# Patient Record
Sex: FEMALE | Race: WHITE | NOT HISPANIC OR LATINO | Employment: OTHER | URBAN - METROPOLITAN AREA
[De-identification: names, ages, dates, MRNs, and addresses within clinical notes are randomized per-mention and may not be internally consistent; named-entity substitution may affect disease eponyms.]

---

## 2017-01-03 ENCOUNTER — TRANSCRIBE ORDERS (OUTPATIENT)
Dept: ADMINISTRATIVE | Facility: HOSPITAL | Age: 72
End: 2017-01-03

## 2017-01-03 DIAGNOSIS — E04.1 NONTOXIC UNINODULAR GOITER: Primary | ICD-10-CM

## 2017-01-04 DIAGNOSIS — E04.1 NONTOXIC SINGLE THYROID NODULE: ICD-10-CM

## 2017-03-20 ENCOUNTER — HOSPITAL ENCOUNTER (OUTPATIENT)
Dept: RADIOLOGY | Facility: HOSPITAL | Age: 72
Discharge: HOME | End: 2017-03-20
Attending: SURGERY | Admitting: SURGERY
Payer: MEDICARE

## 2017-03-20 DIAGNOSIS — E04.1 NONTOXIC SINGLE THYROID NODULE: ICD-10-CM

## 2017-03-20 DIAGNOSIS — E04.1 NONTOXIC UNINODULAR GOITER: ICD-10-CM

## 2017-03-20 PROCEDURE — 88172 CYTP DX EVAL FNA 1ST EA SITE: CPT | Performed by: SURGERY

## 2017-03-20 PROCEDURE — 10022 HB FNA W/IMAGE: CPT

## 2017-03-20 PROCEDURE — 76942 ECHO GUIDE FOR BIOPSY: CPT

## 2017-03-20 PROCEDURE — 88173 CYTOPATH EVAL FNA REPORT: CPT | Performed by: SURGERY

## 2017-03-20 RX ORDER — LIDOCAINE HYDROCHLORIDE 10 MG/ML
5 INJECTION, SOLUTION INFILTRATION; PERINEURAL ONCE
Status: COMPLETED | OUTPATIENT
Start: 2017-03-20 | End: 2017-03-20

## 2017-03-20 RX ADMIN — LIDOCAINE HYDROCHLORIDE 5 ML: 10 INJECTION, SOLUTION INFILTRATION; PERINEURAL at 10:15

## 2017-03-21 ENCOUNTER — GENERIC CONVERSION - ENCOUNTER (OUTPATIENT)
Dept: OTHER | Facility: OTHER | Age: 72
End: 2017-03-21

## 2017-03-28 ENCOUNTER — GENERIC CONVERSION - ENCOUNTER (OUTPATIENT)
Dept: OTHER | Facility: OTHER | Age: 72
End: 2017-03-28

## 2017-10-23 ENCOUNTER — ALLSCRIPTS OFFICE VISIT (OUTPATIENT)
Dept: OTHER | Facility: OTHER | Age: 72
End: 2017-10-23

## 2017-10-24 NOTE — PROGRESS NOTES
Assessment  1  Visit for screening mammogram (V76 12) (Z12 31)   2  Encounter for gynecological examination without abnormal finding (V72 31) (Z01 419)    Pelvic exam reveals the uterus to prolapsed to the introitus  Uterus is mobile normal size  No adnexal masses are identified  Cervix is normal to appearance  There is no blood or discharge in the vagina  The vulva is normal  Rectal exam shows no blood or masses in the rectum and no nodularity in the cul-de-sac  Breast exam is normal      Plan  Encounter for gynecological examination without abnormal finding    · (1) THIN PREP PAP WITH IMAGING; Status:Active - Retrospective Authorization; Requested XDY:[id number];    Perform:[facility] [place] [address]; RVF:[id number]; Last Updated By:Andi Pichardo; 10/23/2017 9:17:38 AM;Ordered;For:Encounter for gynecological examination without abnormal finding; Ordered By:Vinay Marcum; Maturation index required? : No  :   HPV? : if ASCUS   · Follow-up visit in 1 year Evaluation and Treatment  Follow-up  Status: Hold For -  Scheduling  Requested for: 82YNP6065   Ordered; For: Encounter for gynecological examination without abnormal finding; Ordered By: Jared Plunkett Performed:  Due: 95SJC9748  Encounter for screening mammogram for malignant neoplasm of breast    · * MAMMO SCREENING BILATERAL W CAD; Status:Active; Requested for:93Cjz6501;    Perform:[facility]; OET:[id number]; Ordered;For:Encounter for screening mammogram for malignant neoplasm of breast; Ordered By:Vinay Marcum; No Pap smear was taken at this visit  The patient was given a slip for bilateral screening mammogram to be performed after November 17, 2017  She will return in one year for follow-up GYN evaluation of her uterine prolapse  Discussion/Summary    The patient was informed that her breast and pelvic exam are normal except for uterine prolapse  She will call she sees any vaginal bleeding over the next year  Chief Complaint  annual exam no problems      History of Present Illness  HPI: This 23-year-old patient has had no vaginal bleeding or episodes of vaginitis over the past year  She has no hot flashes chronic headaches or fainting spells  She sleeps poorly at night but is in bed at 8:00 at night and gets up at 6 and the morning  She sleeps intermittently the last void frequently  I did give her a prescription for Ativan 1 mg to take a half hour before bedtime #21  She did have a thyroid biopsy in March of this year which showed tissue to be benign  She is not bothered by pressure with her uterine prolapse  She does take Senokot occasionally for constipation  She does not have any problem with urine stress incontinence or recurrent urinary infections  GYN HM, Adult Female [facility]:   General Health:   Lifestyle:  She does not use tobacco -- She consumes alcohol -- She denies drug use  Reproductive health: the patient is postmenopausal--   she reports no menstrual problems  -- she uses no contraception  -- she is not sexually active  -- pregnancy history: G 3P 3,-- 3  Screening: Cervical cancer screening includes a pap smear performed 10/14/2016  Breast cancer screening includes a mammogram performed 11/17/2016  Colorectal cancer screening includes uncertain timing of the last colonoscopy  Review of Systems    Constitutional: No fever, no chills, feels well, no tiredness, no recent weight gain or loss  ENT: no ear ache, no loss of hearing, no nosebleeds or nasal discharge, no sore throat or hoarseness  Cardiovascular: no complaints of slow or fast heart rate, no chest pain, no palpitations, no leg claudication or lower extremity edema  Respiratory: no complaints of shortness of breath, no wheezing, no dyspnea on exertion, no orthopnea or PND  Breasts: no complaints of breast pain, breast lump or nipple discharge     Gastrointestinal: no complaints of abdominal pain, no constipation, no nausea or diarrhea, no vomiting, no bloody stools  Genitourinary: no complaints of dysuria, no incontinence, no pelvic pain, no dysmenorrhea, no vaginal discharge or abnormal vaginal bleeding  Musculoskeletal: no complaints of arthralgia, no myalgia, no joint swelling or stiffness, no limb pain or swelling  Integumentary: no complaints of skin rash or lesion, no itching or dry skin, no skin wounds  Neurological: no complaints of headache, no confusion, no numbness or tingling, no dizziness or fainting  Active Problems  1  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)   2  Encounter for gynecological examination without abnormal finding (V72 31) (Z01 419)   3  Encounter for screening mammogram for malignant neoplasm of breast (V76 12)   (Z12 31)   4  First degree uterine prolapse (618 1) (N81 2)   5  Nodule of left lobe of thyroid gland (241 0) (E04 1)   6  Nodule of neck (784 2) (R22 1)   7  Thyroid nodule (241 0) (E04 1)   8  Visit for screening mammogram (V76 12) (Z12 31)    Past Medical History   · History of Ovarian cyst (620 2) (N83 20)    Surgical History   · History of Cholecystectomy    Family History  Mother    · Family history of Diabetes   · Family history of malignant neoplasm of brain (V16 8) (Z80 8)  Father    · Family history of Diabetes    Social History   · Always uses seat belt   · Being A Social Drinker   · Daily Coffee Consumption (___ Cups/Day)   ·    · Exercise Frequency (Times/Week)   · Full-time employment   · Marital History - Single   · Never a smoker   · No drug use   · Occasional alcohol use   · Unknown If Ever Smoked    Current Meds   1  Lisinopril 10 MG Oral Tablet; Therapy: [phone]) to Recorded   2  Sertraline HCl - 50 MG Oral Tablet; Therapy: 40Ffk1858 to (Last Rx:55Rra0530)  Requested for: 35Sbb1115 Ordered    Allergies  1   No Known Drug Allergies    Vitals   Recorded: 93UZS7108 08:82GB   Systolic 381   Diastolic 72   Height 5 ft 2 25 in   Weight 162 lb    BMI Calculated 29 39   BSA Calculated 1 75   LMP      Physical Exam    Constitutional   General appearance: No acute distress, well appearing and well nourished  Neck   Neck: Normal, supple, trachea midline, no masses  Thyroid: Normal, no thyromegaly  Pulmonary   Respiratory effort: No increased work of breathing or signs of respiratory distress  Auscultation of lungs: Clear to auscultation  Cardiovascular   Auscultation of heart: Normal rate and rhythm, normal S1 and S2, no murmurs  Peripheral vascular exam: Normal pulses Throughout  Genitourinary   External genitalia: Normal and no lesions appreciated  Vagina: Normal, no lesions or dryness appreciated  Urethra: Normal     Urethral meatus: Normal     Bladder: Normal, soft, non-tender and no prolapse or masses appreciated  Cervix: Abnormal  -- Cervix prolapses to just inside the introitus  Uterus: Normal, non-tender, not enlarged, and no palpable masses  Adnexa/parametria: Normal, non-tender and no fullness or masses appreciated  Anus, perineum, and rectum: Normal sphincter tone, no masses, and no prolapse  Chest   Breasts: Normal and no dimpling or skin changes noted  Abdomen   Abdomen: Normal, non-tender, and no organomegaly noted  Liver and spleen: No hepatomegaly or splenomegaly  Examination for hernias: No hernias appreciated  Lymphatic   Palpation of lymph nodes in neck, axillae, groin and/or other locations: No lymphadenopathy or masses noted  Skin   Skin and subcutaneous tissue: Normal skin turgor and no rashes      Palpation of skin and subcutaneous tissue: Normal     Psychiatric   Orientation to person, place, and time: Normal     Mood and affect: Normal        Signatures   Electronically signed by : Maximino Brush MD; Oct 23 2017  9:31AM EST                       (Author)

## 2017-11-17 DIAGNOSIS — Z12.31 ENCOUNTER FOR SCREENING MAMMOGRAM FOR MALIGNANT NEOPLASM OF BREAST: ICD-10-CM

## 2017-11-20 ENCOUNTER — GENERIC CONVERSION - ENCOUNTER (OUTPATIENT)
Dept: OTHER | Facility: OTHER | Age: 72
End: 2017-11-20

## 2017-11-21 ENCOUNTER — GENERIC CONVERSION - ENCOUNTER (OUTPATIENT)
Dept: OTHER | Facility: OTHER | Age: 72
End: 2017-11-21

## 2018-01-09 NOTE — RESULT NOTES
Verified Results  (1) FINE NEEDLE ASPIRATION [id number] 11:01AM Prisca Valdez     Test Name Result Flag Reference   LAB AP CASE REPORT (Report)     Non-gynecologic Cytology             Case: [id number]                  Authorizing Provider: Brenda Vega MD    Collected:      12/15/2016 1101        Ordering Location:   [address] [place] [address]   Received:      12/15/2016 [facility] Ultrasound                              Pathologist:      Genaro León MD                             Specimens:  A) - Thyroid, Left, mid-lower                                     B) - Thyroid, Left, mid-lower   LAB AP CYTO FINAL DIAGNOSIS (Report)     A - B  Thyroid, left mid-lower, fine needle aspiration (Thin-prep and   smear preparations):  Non-diagnostic ([place] Category I) - See note  - Virtually acellular specimen (see note)    Note: Specimen processed and examined, but nondiagnostic because the   specimen consists of scattered histiocytes and rare follicular cells;   interpretation is limited by insufficient folliuclar cells and/or colloid  Recommend correlation with nodule size and complexity on ultrasound to   assist with further management of the lesion  Electronically signed by Genaro León MD on 12/16/2016 at 2:06 PM   LAB AP INTRAOPERATIVE CONSULTATION      Thyroid, left mid-lower pole,  FNAB on-site evaluation: Inadequate  Scanty  Request additional passes  Dr Magaly Briseno   LAB AP GROSS DESCRIPTION      A  Thyroid, Left, mid-lower: 20ml  Bloody, received in CytoLyt    B   Thyroid, Left, mid-lower: 12 slides received ( 6 diff quik / 6 alcohol   fixed )   LAB AP NONGYN ADDITIONAL INFORMATION (Report)     [employer] FDA approved ,  and ThinPrep Imaging System are   utilized with strict adherence to the 's instruction manual to   prepare gynecologic and non-gynecologic cytology specimens for the   production of ThinPrep slides as well as for gynecologic ThinPrep imaging  These processes have been validated by our laboratory and/or by the     These tests were developed and their performance characteristics   determined by [facility] or [facility]  They may not be cleared or approved by the [place]  Food and   Drug Administration  The FDA has determined that such clearance or   approval is not necessary  These tests are used for clinical purposes  They should not be regarded as investigational or for research  This   laboratory has been approved by [place] [address], designated as a high-complexity   laboratory and is qualified to perform these tests      Interpretation performed at [facility]   LAB AP CLINICAL INFORMATION      Size: 1 88X1 29X1 52 Margins: SMOOTH Echogenicity: SOLID Microcalcs: NOT GIVEN Flow: INTRANODULAR/PERIPHERAL Size change: MINIMAL Suspicion level: NOT GIVEN Hx of Hashimoto's Thyroiditis: NO

## 2018-01-10 NOTE — MISCELLANEOUS
Message   Recorded as Task   Date: 11/20/2017 11:18 AM, Created By: Caitlin Srivastava   Task Name: Med Renewal Request   Assigned To: Thomas Nieves   Regarding Patient: Nanci Melton, Status: In Progress   Comment:    Yodit Simmons - 20 Nov 2017 11:18 AM     TASK CREATED  Pt called office today, left voicemail message  Pt saw Dr Dash Chester on 10/23/17, scheduled to see him again on 10/26/18  Pt states she was previously prescribed Lorazepam by Dr Dash Chester, wants refill of Lorazepam   Pt states she can be reached @ 21 [phone]  Thank you! Belem Foots - 20 Nov 2017 12:15 PM     TASK IN PROGRESS   Belem Foots - 20 Nov 2017 12:17 PM     TASK EDITED  At her yly you gave her 1 mg Ativan for sleep  May she have 90nd 1 refill? 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)   2  Encounter for gynecological examination without abnormal finding (V72 31) (Z01 419)   3  Encounter for screening mammogram for malignant neoplasm of breast (V76 12)   (Z12 31)   4  First degree uterine prolapse (618 1) (N81 2)   5  Nodule of left lobe of thyroid gland (241 0) (E04 1)   6  Nodule of neck (784 2) (R22 1)   7  Thyroid nodule (241 0) (E04 1)   8  Visit for screening mammogram (V76 12) (Z12 31)    Current Meds   1  Lisinopril 10 MG Oral Tablet; Therapy: ([phone]) to Recorded   2  Sertraline HCl - 50 MG Oral Tablet; Therapy: 88Dhp9350 to (Last Rx:19Yap8422)  Requested for: 09Ccf2396 Ordered    Allergies    1  No Known Drug Allergies    Signatures   Electronically signed by :  Renu Dumont, ; Nov 20 2017  3:29PM EST                       (Author)

## 2018-01-11 NOTE — RESULT NOTES
Verified Results  US GUIDED THYROID BIOPSY 20Mar2017 08:50AM Jilda Nageotte     Test Name Result Flag Reference   US GUIDED THYROID BIOPSY (Report)     ULTRASOUND-GUIDED THYROID BIOPSY     HISTORY: 80year-old with history of ultrasound demonstrating thyroid nodules amenable to biopsy  Patient presents with prescription for biopsy of left mid to lower thyroid nodule and left lower thyroid nodule with Afrima testing         COMPARISON: Ultrasound thyroid biopsy 12/15/2016 outside images 11/17/2016  FINDINGS:      Prior ultrasound images were reviewed  The left mid to lower nodule and left lower nodule were targeted for today's biopsy  The procedure was explained to the patient, including risks of hemorrhage, infection and local injury  Informed consent was freely obtained  The patient verbalized expressed understanding of the above risks and wished to proceed with the procedure  Final standard time-out procedure performed  PROCEDURE: The neck was prepped and draped in normal sterile fashion  Under real-time ultrasound guidance and local anesthesia 4 passes with a 25 gauge needle were made through the left mid to lower nodule measuring today 1 9 x 1 2 x 1 7 cm  Cytopathology was present and deemed the specimens adequate for evaluation  Under real-time ultrasound guidance and local anesthesia 6 passes with a 25 gauge needle were made through the left lower pole nodule measuring today 1 1 x 0 9 x 1 1 cm  Cytopathology was present and deemed the specimens adequate for evaluation  The patient tolerated the procedure well  Postprocedure instructions were provided for the patient  I asked the patient to call us with any questions, concerns, or acute problems  The patient expressed understanding of the above  IMPRESSION:     Status post successful ultrasound-guided thyroid biopsy         Procedure was performed by TAMMY Mckeon PA-C under the direct supervision of   Shelly       PERFORMED, DICTATED AND SIGNED BY: KAMILA EVANS       Workstation performed: [id number]     Signed by:   Vijaya Brandon MD   3/20/17

## 2018-01-13 VITALS
HEIGHT: 62 IN | WEIGHT: 162 LBS | BODY MASS INDEX: 29.81 KG/M2 | DIASTOLIC BLOOD PRESSURE: 72 MMHG | SYSTOLIC BLOOD PRESSURE: 122 MMHG

## 2018-01-13 NOTE — RESULT NOTES
Verified Results  Heather 634 THYROID BIOPSY 02Nzo6896 09:37AM Tiana Zarate Order Number: [id number]   Performing Comments: Left thyroid nodule   - Patient Instructions: To schedule this appointment, please contact Central Scheduling at [phone]  Test Name Result Flag Reference   US GUIDED THYROID BIOPSY (Report)     ULTRASOUND-GUIDED THYROID BIOPSY     HISTORY: History of nodule in the left thyroid lobe  COMPARISON: Outside ultrasound performed on 11/17/2016  FINDINGS:      Prior ultrasound images were reviewed  The procedure was explained to the patient, including risks of hemorrhage, infection and local injury  Informed consent was freely obtained  The patient verbalized expressed understanding of the above risks and wished to proceed with the procedure  Final standard time-out procedure performed  PROCEDURE: The neck was prepped and draped in normal sterile fashion  Under real-time ultrasound guidance and local anesthesia 6 passes with a 27 (x1) and 25 (x5) gauge needle were made through the left lower lobe thyroid nodule  Cytopathology was    present and deemed these initial passes inadequate, therefore 3 additional passes with a 27-gauge needle were performed  The patient tolerated the procedure well  Postprocedure instructions were provided for the patient  I asked the patient to call us with any questions, concerns, or acute problems  The patient expressed understanding of the above  IMPRESSION:     Status post successful ultrasound-guided thyroid biopsy         Workstation performed: [id number]     Signed by:   Luis M Cabrera MD   12/15/16

## 2018-01-13 NOTE — MISCELLANEOUS
Message   Recorded as Task   Date: 12/12/2016 02:51 PM, Created By: Ashley Peter   Task Name: Care Coordination   Assigned To: MARIANNE GYN,Team   Regarding Patient: Vickie Brewer, Status: In Progress   Comment:    Bernie Aparicio - 12 Dec 2016 2:51 PM     TASK CREATED  Caller: Self; Care Coordination; [phone] (Home); [phone] (Work)  pt would like us to fax her thyroid [facility] @[phone]  (fax #)  Adwoa Mac - 12 Dec 2016 3:21 PM     TASK IN PROGRESS   Adwoa Mac - 12 Dec 2016 3:26 PM     TASK EDITED  faxed US thyroid result to Dr Kennedy Pretty @ [phone]  Made pt aware this was done  Active Problems    1  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)   2  Encounter for gynecological examination without abnormal finding (V72 31) (Z01 419)   3  Encounter for screening mammogram for malignant neoplasm of breast (V76 12)   (Z12 31)   4  First degree uterine prolapse (618 1) (N81 2)   5  Nodule of left lobe of thyroid gland (241 0) (E04 1)   6  Nodule of neck (784 2) (R22 1)   7  Thyroid nodule (241 0) (E04 1)   8  Visit for screening mammogram (V76 12) (Z12 31)    Current Meds   1  Lisinopril 10 MG Oral Tablet; Therapy: ([id number]) to Recorded   2  Sertraline HCl - 50 MG Oral Tablet; Therapy: 45Ijs4224 to (Last Rx:52Sqx4551)  Requested for: 82Mkd0456 Ordered    Allergies    1   No Known Drug Allergies    Signatures   Electronically signed by : Darylene Cure, ; Dec 12 2016  3:27PM EST                       (Author)

## 2018-01-15 NOTE — RESULT NOTES
Verified Results  (1) FINE NEEDLE ASPIRATION 20Mar2017 09:00AM Joyce Rain     Test Name Result Flag Reference   LAB AP CASE REPORT (Report)     Non-gynecologic Cytology             Case: [id number]                  Authorizing Provider: Joshua Tena MD    Collected:      03/20/2017 0900        Ordering Location:   [address] [place] [address]   Received:      03/20/2017 [address] Ultrasound                              Pathologist:      Obed Lizarraga MD                              Specimens:  A) - Thyroid, Left, mid to lower                                    B) - Thyroid, Left, mid to lower slides                                C) - Thyroid, Left, lower pole                                     D) - Thyroid, Left, lower pole slides   LAB AP CYTO FINAL DIAGNOSIS (Report)     A & B  Thyroid, Left, mid to lower: (Thin prep and smear preparations)  Negative for malignancy ([place] Category II) - See note  Few groups of benign follicular cells and abundant colloid, consistent   with a benign follicular nodule  Satisfactory for evaluation  C & D  Thyroid, Left, lower pole: (Thin prep and smear preparations)  Negative for malignancy ([place] Category II) - See note  Groups of benign follicular cells and abundant colloid, consistent with a   benign follicular nodule  Satisfactory for evaluation  Note: As reported in the [facility] for Reporting Thyroid   Cytopathology*, the diagnostic category of benign/negative for   malignancy carries 0% to 3% risk of malignancy being found in subsequent   resections (in the few studies of patients with benign FNA results that   were followed long-term, a falsenegative rate of <1% was reported), with   the usual management being clinical follow-up supplemented by   ultrasonography (US) as indicated  Repeat FNA may be indicated for nodules   showing significant growth or developing US abnormalities   Ultimately,   clinical/imaging correlation for this patient is needed in arriving at the   actual management plan  *The [place] System for Reporting Thyroid Cytopathology, Ruslan Shah, 2010 (1st ed )    Interpretation performed at [address]  Electronically signed by Luis Dumont MD on 3/21/2017 at 3:26 PM   LAB AP NONGYN NOTE (Report)     The treating physician has requested a sample(s) from the above thyroid   nodule(s) be sent for analysis by  [facility] [place] [facility]  ([facility]), performed by [address]   [place], [place])  "[employer] only performs this test on specimen(s) receiving a cytologic   diagnosis of [place] Category III or  IV  If such a diagnosis is rendered (above) specimen will be sent to   THE [facility] [place], and a separate report with  results of [facility] will follow directly from [facility] (typically taking   14 days)  If a cytologic  interpretation other than [place] category III or IV is rendered,   specimen will not be sent for [facility]  LAB AP INTRAOPERATIVE CONSULTATION      Thyroid, Left, mid to lower: Adequate , defer    Thyroid, Left, lower pole: Not adequate  Request additional passes   Adequate, defer on additional passes         [facility]   LAB AP GROSS DESCRIPTION (Report)     A  Thyroid, Left, mid to lower: 20 ml bloody received in CytoLyt    B  Thyroid, Left, mid to lower slides: 4 slides received (2 diff quik, 2   alcohol fixed)     C  Thyroid, Left, lower pole:20 ml bloody received in CytoLyt    D  Thyroid, Left, lower pole slides: 8 slides received (2 diff quik, 2   alcohol fixed)   LAB AP NONGYN ADDITIONAL INFORMATION (Report)     [email or web address]'s FDA approved ,  and ThinPrep Imaging System are   utilized with strict adherence to the 's instruction manual to   prepare gynecologic and non-gynecologic cytology specimens for the   production of ThinPrep slides as well as for gynecologic ThinPrep imaging  These processes have been validated by our laboratory and/or by the     These tests were developed and their performance characteristics   determined by [facility] or [employer]  They may not be cleared or approved by the [place]  Food and   Drug Administration  The FDA has determined that such clearance or   approval is not necessary  These tests are used for clinical purposes  They should not be regarded as investigational or for research  This   laboratory has been approved by [place] [id number], designated as a high-complexity   laboratory and is qualified to perform these tests     LAB AP CLINICAL INFORMATION (Report)     Left mid - lower thyroid:Size: 1 92 x 1 24 x 1 69 cm Margins: smooth Echogenicity: solid Microcalcs: absent Flow: peripheral Size change: minimal Suspicion level: n/a Hx of Hashimoto's Thyroiditis: no    Left lower pole thyroid: Size: 1 10 x  89 x 1 13 cm Margins: smooth Echogenicity: solid Microcalcs: absent Flow: intranodular Size change: minimal Suspicion level: n/a Hx of Hashimoto's Thyroiditis: no

## 2018-01-16 NOTE — RESULT NOTES
Verified Results  (1) THIN PREP PAP WITH IMAGING [id number] 12:00AM [facility]     Test Name Result Flag Reference   LAB AP CASE REPORT (Report)     Gynecologic Cytology Report            Case: [id number]                  Authorizing Provider: Negrita Medeiros MD     Collected:      10/14/2016           First Screen:     MIGUEL Joya    Received:      10/16/2016 0848        Specimen:  LIQUID-BASED PAP, SCREENING, Cervix   LAB AP GYN PRIMARY INTERPRETATION      Negative for intraepithelial lesion or malignancy  Electronically signed by MIGUEL Joya on 10/18/2016 at 3:16 PM   LAB AP GYN SPECIMEN ADEQUACY      Satisfactory for evaluation  Endocervical/transformation zone component present  LAB AP GYN ADDITIONAL INFORMATION (Report)     [employer]'s FDA approved ,  and ThinPrep Imaging System are   utilized with strict adherence to the 's instruction manual to   prepare gynecologic and non-gynecologic cytology specimens for the   production of ThinPrep slides as well as for gynecologic ThinPrep imaging  These processes have been validated by our laboratory and/or by the     The Pap test is not a diagnostic procedure and should not be used as the   sole means to detect cervical cancer  It is only a screening procedure to   aid in the detection of cervical cancer and its precursors  Both   false-negative and false-positive results have been experienced  Your   patient's test result should be interpreted in this context together with   the history and clinical findings     LAB AP LMP

## 2018-01-17 NOTE — MISCELLANEOUS
Message   Recorded as Task   Date: 2016 09:12 AM, Created By: Gabriela Kraft   Task Name: Follow Up   Assigned To: Yisel De Anda   Regarding Patient: Loida Manning, Status: In Progress   HenrikWilbern Fothergill - 2016 9:12 AM     TASK CREATED  Past Yves Winkler to consult with general surgeon to evaluate her thyroid ultrasound report suggests a nodule in the left thyroid lobe which could be biopsied  Brendan Urbina - 2016 9:47 AM     TASK IN PROGRESS   Brendan Urbina - 2016 9:53 AM     TASK EDITED  Santi Gayle - 2016 1:22 PM     TASK EDITED  made pt aware opf recommednations referal placed in allscripts, pt given number for apt to call  Active Problems    1  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)   2  Encounter for gynecological examination without abnormal finding (V72 31) (Z01 419)   3  Encounter for screening mammogram for malignant neoplasm of breast (V76 12)   (Z12 31)   4  First degree uterine prolapse (618 1) (N81 2)   5  Nodule of left lobe of thyroid gland (241 0) (E04 1)   6  Nodule of neck (784 2) (R22 1)   7  Visit for screening mammogram (V76 12) (Z12 31)    Current Meds   1  Lisinopril 10 MG Oral Tablet; Therapy: (919 14 231) to Recorded   2  Sertraline HCl - 50 MG Oral Tablet; Therapy: [id number] to (Last Rx:[id number])  Requested for: [id number] Ordered    Allergies    1   No Known Drug Allergies    Signatures   Electronically signed by : Faith Petersen LPN; [id number]  0:51TT EST                       (Author)

## 2018-07-12 DIAGNOSIS — G47.00 INSOMNIA: Primary | ICD-10-CM

## 2018-07-12 RX ORDER — LORAZEPAM 1 MG/1
TABLET ORAL
Qty: 90 TABLET | Refills: 0 | Status: SHIPPED | OUTPATIENT
Start: 2018-07-12 | End: 2018-10-15 | Stop reason: SDUPTHER

## 2018-08-06 ENCOUNTER — APPOINTMENT (OUTPATIENT)
Dept: LAB | Age: 73
End: 2018-08-06
Payer: MEDICARE

## 2018-08-06 ENCOUNTER — TRANSCRIBE ORDERS (OUTPATIENT)
Dept: ADMINISTRATIVE | Age: 73
End: 2018-08-06

## 2018-08-06 DIAGNOSIS — I10 ESSENTIAL HYPERTENSION, MALIGNANT: ICD-10-CM

## 2018-08-06 DIAGNOSIS — I10 ESSENTIAL HYPERTENSION, MALIGNANT: Primary | ICD-10-CM

## 2018-08-06 LAB
ALBUMIN SERPL BCP-MCNC: 3.7 G/DL (ref 3.5–5)
ALP SERPL-CCNC: 96 U/L (ref 46–116)
ALT SERPL W P-5'-P-CCNC: 22 U/L (ref 12–78)
ANION GAP SERPL CALCULATED.3IONS-SCNC: 8 MMOL/L (ref 4–13)
AST SERPL W P-5'-P-CCNC: 13 U/L (ref 5–45)
BILIRUB SERPL-MCNC: 0.56 MG/DL (ref 0.2–1)
BUN SERPL-MCNC: 15 MG/DL (ref 5–25)
CALCIUM SERPL-MCNC: 9.1 MG/DL (ref 8.3–10.1)
CHLORIDE SERPL-SCNC: 104 MMOL/L (ref 100–108)
CHOLEST SERPL-MCNC: 255 MG/DL (ref 50–200)
CO2 SERPL-SCNC: 26 MMOL/L (ref 21–32)
CREAT SERPL-MCNC: 0.64 MG/DL (ref 0.6–1.3)
GFR SERPL CREATININE-BSD FRML MDRD: 89 ML/MIN/1.73SQ M
GLUCOSE P FAST SERPL-MCNC: 105 MG/DL (ref 65–99)
HDLC SERPL-MCNC: 68 MG/DL (ref 40–60)
LDLC SERPL CALC-MCNC: 163 MG/DL (ref 0–100)
LDLC SERPL DIRECT ASSAY-MCNC: 157 MG/DL (ref 0–100)
NONHDLC SERPL-MCNC: 187 MG/DL
POTASSIUM SERPL-SCNC: 4.3 MMOL/L (ref 3.5–5.3)
PROT SERPL-MCNC: 7.8 G/DL (ref 6.4–8.2)
SODIUM SERPL-SCNC: 138 MMOL/L (ref 136–145)
TRIGL SERPL-MCNC: 121 MG/DL

## 2018-08-06 PROCEDURE — 80053 COMPREHEN METABOLIC PANEL: CPT

## 2018-08-06 PROCEDURE — 80061 LIPID PANEL: CPT

## 2018-08-06 PROCEDURE — 83721 ASSAY OF BLOOD LIPOPROTEIN: CPT

## 2018-08-06 PROCEDURE — 36415 COLL VENOUS BLD VENIPUNCTURE: CPT

## 2018-10-15 DIAGNOSIS — G47.00 INSOMNIA: Primary | ICD-10-CM

## 2018-10-15 RX ORDER — LORAZEPAM 1 MG/1
TABLET ORAL
Qty: 90 TABLET | Refills: 1 | Status: SHIPPED | OUTPATIENT
Start: 2018-10-15 | End: 2019-01-14 | Stop reason: SDUPTHER

## 2018-10-26 ENCOUNTER — ANNUAL EXAM (OUTPATIENT)
Dept: OBGYN CLINIC | Facility: CLINIC | Age: 73
End: 2018-10-26
Payer: MEDICARE

## 2018-10-26 VITALS
WEIGHT: 155 LBS | DIASTOLIC BLOOD PRESSURE: 68 MMHG | SYSTOLIC BLOOD PRESSURE: 132 MMHG | HEIGHT: 63 IN | BODY MASS INDEX: 27.46 KG/M2

## 2018-10-26 DIAGNOSIS — Z01.419 ENCOUNTER FOR GYNECOLOGICAL EXAMINATION (GENERAL) (ROUTINE) WITHOUT ABNORMAL FINDINGS: ICD-10-CM

## 2018-10-26 DIAGNOSIS — Z12.31 ENCOUNTER FOR SCREENING MAMMOGRAM FOR MALIGNANT NEOPLASM OF BREAST: Primary | ICD-10-CM

## 2018-10-26 PROCEDURE — G0101 CA SCREEN;PELVIC/BREAST EXAM: HCPCS | Performed by: OBSTETRICS & GYNECOLOGY

## 2018-10-26 PROCEDURE — G0145 SCR C/V CYTO,THINLAYER,RESCR: HCPCS | Performed by: OBSTETRICS & GYNECOLOGY

## 2018-10-26 NOTE — PATIENT INSTRUCTIONS
This 78-year-old patient was told that her breast exam is normal  Her uterine prolapse is still just inside the introitus as was year ago  Since she has no abnormal urinary tract problems I suggested that she have nothing done about the uterine prolapse at this time

## 2018-10-26 NOTE — PROGRESS NOTES
Assessment/Plan: This 71-year-old patient is seen today for her gyn evaluation  She does have a uterine prolapse and we are following that yearly  No problem-specific Assessment & Plan notes found for this encounter  Subjective:      Patient ID: Ijeoma De La Garza is a 68 y o  female  This 71-year-old patient has had no vaginal bleeding or episodes of vaginitis over the past year  [place] is not occurring at this time  She does sleep at night with the help of Ativan 1 mg at times  She gets up at night to urinate to frequently  She has had no urinary tract infections over the past year  She does not wear liners or pads due to urine loss  She did have an episode of diverticulitis treated as an outpatient with antibiotics a few months ago  She lost about 10 lb during that week  She had a mammogram November 21, 2017 which did showed normal findings  Her Pap smear was last done October 14, 2016 was normal       Gynecologic Exam             Review of Systems   Constitutional: Negative  HENT: Negative  Eyes: Negative  Respiratory: Negative  Cardiovascular: Negative  Gastrointestinal: Negative  She did have a episode of diverticulitis a few months ago  Endocrine: Negative  Genitourinary: Negative  Musculoskeletal: Negative  Skin: Negative  Allergic/Immunologic: Negative  Neurological: Negative  Hematological: Negative  Psychiatric/Behavioral: Negative  Objective:      /68 (BP Location: Left arm, Patient Position: Sitting)   Ht 5' 3" (1 6 m)   Wt 70 3 kg (155 lb)   BMI 27 46 kg/m²          Physical Exam   Constitutional: She is oriented to person, place, and time  She appears well-developed and well-nourished  HENT:   Head: Normocephalic  Neck: Normal range of motion  Neck supple  Thyromegaly present  The right lobe of the thyroid is slightly enlarged     Cardiovascular: Normal rate, regular rhythm, normal heart sounds and intact distal pulses  Pulmonary/Chest: Effort normal and breath sounds normal    Abdominal: Soft  Bowel sounds are normal    Genitourinary:   Genitourinary Comments: Her cervix prolapses to just inside the introitus  Musculoskeletal: Normal range of motion  Neurological: She is alert and oriented to person, place, and time  Skin: Skin is warm and dry  Psychiatric: She has a normal mood and affect  Nursing note and vitals reviewed  breast exam is normal  Pelvic exam reveals the cervix to be just inside the introitus  The  uterine fundus appears to be slightly retroverted but and mobile normal size no adnexal masses are identified  There is no blood or discharge in the vagina  The vulva is normal  Rectal exam shows no  masses in the rectum and no nodularity in the cul-de-sac

## 2018-11-02 LAB
LAB AP GYN PRIMARY INTERPRETATION: NORMAL
Lab: NORMAL

## 2019-01-14 DIAGNOSIS — G47.00 INSOMNIA: Primary | ICD-10-CM

## 2019-01-14 RX ORDER — LORAZEPAM 0.5 MG/1
TABLET ORAL
Qty: 180 TABLET | Refills: 2 | Status: SHIPPED | OUTPATIENT
Start: 2019-01-14

## 2019-05-29 DIAGNOSIS — F51.01 PRIMARY INSOMNIA: Primary | ICD-10-CM

## 2019-05-31 RX ORDER — LORAZEPAM 1 MG/1
TABLET ORAL
Qty: 30 TABLET | Refills: 2 | Status: SHIPPED | OUTPATIENT
Start: 2019-05-31 | End: 2019-08-29 | Stop reason: SDUPTHER

## 2019-07-26 ENCOUNTER — APPOINTMENT (OUTPATIENT)
Dept: LAB | Age: 74
End: 2019-07-26
Payer: MEDICARE

## 2019-07-26 ENCOUNTER — TRANSCRIBE ORDERS (OUTPATIENT)
Dept: ADMINISTRATIVE | Age: 74
End: 2019-07-26

## 2019-07-26 DIAGNOSIS — I10 ESSENTIAL HYPERTENSION, MALIGNANT: Primary | ICD-10-CM

## 2019-07-26 DIAGNOSIS — I10 ESSENTIAL HYPERTENSION, MALIGNANT: ICD-10-CM

## 2019-07-26 LAB
ALBUMIN SERPL BCP-MCNC: 3.9 G/DL (ref 3.5–5)
ALP SERPL-CCNC: 87 U/L (ref 46–116)
ALT SERPL W P-5'-P-CCNC: 27 U/L (ref 12–78)
ANION GAP SERPL CALCULATED.3IONS-SCNC: 5 MMOL/L (ref 4–13)
AST SERPL W P-5'-P-CCNC: 17 U/L (ref 5–45)
BILIRUB SERPL-MCNC: 0.39 MG/DL (ref 0.2–1)
BUN SERPL-MCNC: 18 MG/DL (ref 5–25)
CALCIUM SERPL-MCNC: 9.3 MG/DL (ref 8.3–10.1)
CHLORIDE SERPL-SCNC: 105 MMOL/L (ref 100–108)
CHOLEST SERPL-MCNC: 241 MG/DL (ref 50–200)
CO2 SERPL-SCNC: 25 MMOL/L (ref 21–32)
CREAT SERPL-MCNC: 0.72 MG/DL (ref 0.6–1.3)
GFR SERPL CREATININE-BSD FRML MDRD: 83 ML/MIN/1.73SQ M
GLUCOSE P FAST SERPL-MCNC: 102 MG/DL (ref 65–99)
HDLC SERPL-MCNC: 72 MG/DL (ref 40–60)
LDLC SERPL CALC-MCNC: 155 MG/DL (ref 0–100)
POTASSIUM SERPL-SCNC: 4.7 MMOL/L (ref 3.5–5.3)
PROT SERPL-MCNC: 7.7 G/DL (ref 6.4–8.2)
SODIUM SERPL-SCNC: 135 MMOL/L (ref 136–145)
TRIGL SERPL-MCNC: 71 MG/DL

## 2019-07-26 PROCEDURE — 80061 LIPID PANEL: CPT

## 2019-07-26 PROCEDURE — 80053 COMPREHEN METABOLIC PANEL: CPT

## 2019-07-26 PROCEDURE — 36415 COLL VENOUS BLD VENIPUNCTURE: CPT

## 2019-08-29 DIAGNOSIS — F51.01 PRIMARY INSOMNIA: ICD-10-CM

## 2019-08-30 RX ORDER — LORAZEPAM 1 MG/1
TABLET ORAL
Qty: 30 TABLET | Refills: 2 | Status: SHIPPED | OUTPATIENT
Start: 2019-08-30 | End: 2019-12-06 | Stop reason: SDUPTHER

## 2019-09-04 ENCOUNTER — TELEPHONE (OUTPATIENT)
Dept: OBGYN CLINIC | Facility: CLINIC | Age: 74
End: 2019-09-04

## 2019-12-06 DIAGNOSIS — F51.01 PRIMARY INSOMNIA: ICD-10-CM

## 2019-12-06 RX ORDER — LORAZEPAM 1 MG/1
TABLET ORAL
Qty: 30 TABLET | Refills: 2 | Status: SHIPPED | OUTPATIENT
Start: 2019-12-06

## 2019-12-09 ENCOUNTER — ANNUAL EXAM (OUTPATIENT)
Dept: OBGYN CLINIC | Facility: CLINIC | Age: 74
End: 2019-12-09
Payer: MEDICARE

## 2019-12-09 VITALS
WEIGHT: 142 LBS | HEIGHT: 63 IN | BODY MASS INDEX: 25.16 KG/M2 | DIASTOLIC BLOOD PRESSURE: 62 MMHG | SYSTOLIC BLOOD PRESSURE: 116 MMHG

## 2019-12-09 DIAGNOSIS — Z01.419 ENCOUNTER FOR GYNECOLOGICAL EXAMINATION (GENERAL) (ROUTINE) WITHOUT ABNORMAL FINDINGS: ICD-10-CM

## 2019-12-09 DIAGNOSIS — Z12.31 ENCOUNTER FOR SCREENING MAMMOGRAM FOR MALIGNANT NEOPLASM OF BREAST: Primary | ICD-10-CM

## 2019-12-09 PROCEDURE — 99203 OFFICE O/P NEW LOW 30 MIN: CPT | Performed by: OBSTETRICS & GYNECOLOGY

## 2019-12-09 NOTE — PROGRESS NOTES
Assessment/Plan: This 67-year-old patient is seen for a bowel UA shin of abdominal cramps and uterine prolapse  Diagnoses and all orders for this visit:    Encounter for screening mammogram for malignant neoplasm of breast  -     Mammo screening bilateral w 3d & cad; Future    Encounter for gynecological examination (general) (routine) without abnormal findings          Subjective:     Patient ID: Crystal Freeman is a 69 y o  female  HPI this 67-year-old patient has had no vaginal bleeding or episodes of vaginitis over the past year  [place] is not occurring  She has no chronic headaches or fainting spells or hot flashes  She has had some pelvic discomfort recently  About 3 months ago were son-in-law  of acute massive heart attack  She has lost about 13 lb over the past year  Her exam year ago showed that her cervix was located just inside the introitus  Recent colonoscopy did show 3 polyps  There was some diverticulosis  She does not wear pads or liners for urine stress incontinence  She has had no urinary infections recently  She occasionally does have some increased activity with her bowel function  She did have a 3D bilateral screening mammogram 2019 which was normal   Her Pap smear from 2018 was normal   Her blood pressure is 116/62 and her weight is 142 lb  She occasionally has urgency of urination  She has had some discomfort in her right flank  If this continues she will discuss this with her family physician  Review of Systems  see HPI    Objective:     Physical Exam  breast exam is normal   Pelvic exam reveals uterus to be anterior mobile normal size and about 1 in inside the introitus  There is no blood or discharge in the vagina  The vulva is normal   The cervix is normal rectal exam shows no masses or blood in the rectum and no nodularity in the cul-de-sac

## 2020-03-13 DIAGNOSIS — F51.01 PRIMARY INSOMNIA: ICD-10-CM

## 2020-03-17 DIAGNOSIS — G47.00 INSOMNIA: ICD-10-CM

## 2020-03-17 RX ORDER — LORAZEPAM 0.5 MG/1
TABLET ORAL
Qty: 180 TABLET | Refills: 2 | Status: CANCELLED | OUTPATIENT
Start: 2020-03-17

## 2020-09-18 RX ORDER — LORAZEPAM 1 MG/1
TABLET ORAL
Qty: 30 TABLET | Refills: 0 | OUTPATIENT
Start: 2020-09-18

## 2020-11-03 ENCOUNTER — APPOINTMENT (OUTPATIENT)
Dept: RADIOLOGY | Facility: MEDICAL CENTER | Age: 75
End: 2020-11-03
Payer: MEDICARE

## 2020-11-03 ENCOUNTER — TRANSCRIBE ORDERS (OUTPATIENT)
Dept: ADMINISTRATIVE | Facility: HOSPITAL | Age: 75
End: 2020-11-03

## 2020-11-03 DIAGNOSIS — S93.401A SPRAIN OF RIGHT ANKLE, UNSPECIFIED LIGAMENT, INITIAL ENCOUNTER: ICD-10-CM

## 2020-11-03 DIAGNOSIS — S93.401A SPRAIN OF RIGHT ANKLE, UNSPECIFIED LIGAMENT, INITIAL ENCOUNTER: Primary | ICD-10-CM

## 2020-11-03 PROCEDURE — 73610 X-RAY EXAM OF ANKLE: CPT

## 2020-11-03 PROCEDURE — 73630 X-RAY EXAM OF FOOT: CPT

## 2020-11-13 ENCOUNTER — OFFICE VISIT (OUTPATIENT)
Dept: OBGYN CLINIC | Facility: CLINIC | Age: 75
End: 2020-11-13
Payer: MEDICARE

## 2020-11-13 VITALS
WEIGHT: 142 LBS | DIASTOLIC BLOOD PRESSURE: 87 MMHG | HEART RATE: 99 BPM | SYSTOLIC BLOOD PRESSURE: 139 MMHG | HEIGHT: 63 IN | BODY MASS INDEX: 25.16 KG/M2

## 2020-11-13 DIAGNOSIS — S93.491A SPRAIN OF ANTERIOR TALOFIBULAR LIGAMENT OF RIGHT ANKLE, INITIAL ENCOUNTER: Primary | ICD-10-CM

## 2020-11-13 PROCEDURE — 99203 OFFICE O/P NEW LOW 30 MIN: CPT | Performed by: ORTHOPAEDIC SURGERY

## 2020-11-25 DIAGNOSIS — Z12.31 ENCOUNTER FOR SCREENING MAMMOGRAM FOR MALIGNANT NEOPLASM OF BREAST: ICD-10-CM

## 2021-02-22 ENCOUNTER — IMMUNIZATIONS (OUTPATIENT)
Dept: FAMILY MEDICINE CLINIC | Facility: HOSPITAL | Age: 76
End: 2021-02-22

## 2021-02-22 DIAGNOSIS — Z23 ENCOUNTER FOR IMMUNIZATION: Primary | ICD-10-CM

## 2021-02-22 PROCEDURE — 91300 SARS-COV-2 / COVID-19 MRNA VACCINE (PFIZER-BIONTECH) 30 MCG: CPT

## 2021-02-22 PROCEDURE — 0001A SARS-COV-2 / COVID-19 MRNA VACCINE (PFIZER-BIONTECH) 30 MCG: CPT

## 2021-03-13 ENCOUNTER — IMMUNIZATIONS (OUTPATIENT)
Dept: FAMILY MEDICINE CLINIC | Facility: HOSPITAL | Age: 76
End: 2021-03-13

## 2021-03-13 DIAGNOSIS — Z23 ENCOUNTER FOR IMMUNIZATION: Primary | ICD-10-CM

## 2021-03-13 PROCEDURE — 0002A SARS-COV-2 / COVID-19 MRNA VACCINE (PFIZER-BIONTECH) 30 MCG: CPT

## 2021-03-13 PROCEDURE — 91300 SARS-COV-2 / COVID-19 MRNA VACCINE (PFIZER-BIONTECH) 30 MCG: CPT

## 2021-08-04 ENCOUNTER — APPOINTMENT (OUTPATIENT)
Dept: LAB | Age: 76
End: 2021-08-04
Payer: MEDICARE

## 2021-08-04 DIAGNOSIS — I10 ESSENTIAL HYPERTENSION, MALIGNANT: Primary | ICD-10-CM

## 2021-08-04 LAB
ALBUMIN SERPL BCP-MCNC: 3.6 G/DL (ref 3.5–5)
ALP SERPL-CCNC: 87 U/L (ref 46–116)
ALT SERPL W P-5'-P-CCNC: 24 U/L (ref 12–78)
ANION GAP SERPL CALCULATED.3IONS-SCNC: 7 MMOL/L (ref 4–13)
AST SERPL W P-5'-P-CCNC: 15 U/L (ref 5–45)
BASOPHILS # BLD AUTO: 0.07 THOUSANDS/ΜL (ref 0–0.1)
BASOPHILS NFR BLD AUTO: 1 % (ref 0–1)
BILIRUB SERPL-MCNC: 0.56 MG/DL (ref 0.2–1)
BUN SERPL-MCNC: 20 MG/DL (ref 5–25)
CALCIUM SERPL-MCNC: 9.1 MG/DL (ref 8.3–10.1)
CHLORIDE SERPL-SCNC: 106 MMOL/L (ref 100–108)
CHOLEST SERPL-MCNC: 239 MG/DL (ref 50–200)
CO2 SERPL-SCNC: 24 MMOL/L (ref 21–32)
CREAT SERPL-MCNC: 0.52 MG/DL (ref 0.6–1.3)
EOSINOPHIL # BLD AUTO: 0.17 THOUSAND/ΜL (ref 0–0.61)
EOSINOPHIL NFR BLD AUTO: 3 % (ref 0–6)
ERYTHROCYTE [DISTWIDTH] IN BLOOD BY AUTOMATED COUNT: 13.1 % (ref 11.6–15.1)
GFR SERPL CREATININE-BSD FRML MDRD: 94 ML/MIN/1.73SQ M
GLUCOSE P FAST SERPL-MCNC: 102 MG/DL (ref 65–99)
HCT VFR BLD AUTO: 42.4 % (ref 34.8–46.1)
HDLC SERPL-MCNC: 85 MG/DL
HGB BLD-MCNC: 13.9 G/DL (ref 11.5–15.4)
IMM GRANULOCYTES # BLD AUTO: 0.04 THOUSAND/UL (ref 0–0.2)
IMM GRANULOCYTES NFR BLD AUTO: 1 % (ref 0–2)
LDLC SERPL CALC-MCNC: 142 MG/DL (ref 0–100)
LDLC SERPL DIRECT ASSAY-MCNC: 137 MG/DL (ref 0–100)
LYMPHOCYTES # BLD AUTO: 1.91 THOUSANDS/ΜL (ref 0.6–4.47)
LYMPHOCYTES NFR BLD AUTO: 28 % (ref 14–44)
MCH RBC QN AUTO: 31.3 PG (ref 26.8–34.3)
MCHC RBC AUTO-ENTMCNC: 32.8 G/DL (ref 31.4–37.4)
MCV RBC AUTO: 96 FL (ref 82–98)
MONOCYTES # BLD AUTO: 0.57 THOUSAND/ΜL (ref 0.17–1.22)
MONOCYTES NFR BLD AUTO: 9 % (ref 4–12)
NEUTROPHILS # BLD AUTO: 3.97 THOUSANDS/ΜL (ref 1.85–7.62)
NEUTS SEG NFR BLD AUTO: 58 % (ref 43–75)
NONHDLC SERPL-MCNC: 154 MG/DL
NRBC BLD AUTO-RTO: 0 /100 WBCS
PLATELET # BLD AUTO: 285 THOUSANDS/UL (ref 149–390)
PMV BLD AUTO: 8.7 FL (ref 8.9–12.7)
POTASSIUM SERPL-SCNC: 3.9 MMOL/L (ref 3.5–5.3)
PROT SERPL-MCNC: 7.6 G/DL (ref 6.4–8.2)
RBC # BLD AUTO: 4.44 MILLION/UL (ref 3.81–5.12)
SODIUM SERPL-SCNC: 137 MMOL/L (ref 136–145)
TRIGL SERPL-MCNC: 59 MG/DL
WBC # BLD AUTO: 6.73 THOUSAND/UL (ref 4.31–10.16)

## 2021-08-04 PROCEDURE — 36415 COLL VENOUS BLD VENIPUNCTURE: CPT

## 2021-08-04 PROCEDURE — 80061 LIPID PANEL: CPT

## 2021-08-04 PROCEDURE — 83721 ASSAY OF BLOOD LIPOPROTEIN: CPT

## 2021-08-04 PROCEDURE — 80053 COMPREHEN METABOLIC PANEL: CPT

## 2021-08-04 PROCEDURE — 85025 COMPLETE CBC W/AUTO DIFF WBC: CPT

## 2022-08-12 ENCOUNTER — APPOINTMENT (OUTPATIENT)
Dept: LAB | Age: 77
End: 2022-08-12
Payer: MEDICARE

## 2022-08-12 DIAGNOSIS — I10 ESSENTIAL HYPERTENSION, MALIGNANT: ICD-10-CM

## 2022-08-12 LAB
ALBUMIN SERPL BCP-MCNC: 3.3 G/DL (ref 3.5–5)
ALP SERPL-CCNC: 81 U/L (ref 46–116)
ALT SERPL W P-5'-P-CCNC: 23 U/L (ref 12–78)
ANION GAP SERPL CALCULATED.3IONS-SCNC: 2 MMOL/L (ref 4–13)
AST SERPL W P-5'-P-CCNC: 15 U/L (ref 5–45)
BASOPHILS # BLD AUTO: 0.06 THOUSANDS/ΜL (ref 0–0.1)
BASOPHILS NFR BLD AUTO: 1 % (ref 0–1)
BILIRUB SERPL-MCNC: 0.41 MG/DL (ref 0.2–1)
BUN SERPL-MCNC: 23 MG/DL (ref 5–25)
CALCIUM ALBUM COR SERPL-MCNC: 9.5 MG/DL (ref 8.3–10.1)
CALCIUM SERPL-MCNC: 8.9 MG/DL (ref 8.3–10.1)
CHLORIDE SERPL-SCNC: 109 MMOL/L (ref 96–108)
CHOLEST SERPL-MCNC: 230 MG/DL
CO2 SERPL-SCNC: 29 MMOL/L (ref 21–32)
CREAT SERPL-MCNC: 0.7 MG/DL (ref 0.6–1.3)
EOSINOPHIL # BLD AUTO: 0.1 THOUSAND/ΜL (ref 0–0.61)
EOSINOPHIL NFR BLD AUTO: 1 % (ref 0–6)
ERYTHROCYTE [DISTWIDTH] IN BLOOD BY AUTOMATED COUNT: 13.2 % (ref 11.6–15.1)
GFR SERPL CREATININE-BSD FRML MDRD: 84 ML/MIN/1.73SQ M
GLUCOSE P FAST SERPL-MCNC: 93 MG/DL (ref 65–99)
HCT VFR BLD AUTO: 41.3 % (ref 34.8–46.1)
HDLC SERPL-MCNC: 82 MG/DL
HGB BLD-MCNC: 13.1 G/DL (ref 11.5–15.4)
IMM GRANULOCYTES # BLD AUTO: 0.04 THOUSAND/UL (ref 0–0.2)
IMM GRANULOCYTES NFR BLD AUTO: 1 % (ref 0–2)
LDLC SERPL CALC-MCNC: 131 MG/DL (ref 0–100)
LYMPHOCYTES # BLD AUTO: 3.48 THOUSANDS/ΜL (ref 0.6–4.47)
LYMPHOCYTES NFR BLD AUTO: 40 % (ref 14–44)
MCH RBC QN AUTO: 31.1 PG (ref 26.8–34.3)
MCHC RBC AUTO-ENTMCNC: 31.7 G/DL (ref 31.4–37.4)
MCV RBC AUTO: 98 FL (ref 82–98)
MONOCYTES # BLD AUTO: 0.8 THOUSAND/ΜL (ref 0.17–1.22)
MONOCYTES NFR BLD AUTO: 9 % (ref 4–12)
NEUTROPHILS # BLD AUTO: 4.3 THOUSANDS/ΜL (ref 1.85–7.62)
NEUTS SEG NFR BLD AUTO: 48 % (ref 43–75)
NRBC BLD AUTO-RTO: 0 /100 WBCS
PLATELET # BLD AUTO: 291 THOUSANDS/UL (ref 149–390)
PMV BLD AUTO: 8.8 FL (ref 8.9–12.7)
POTASSIUM SERPL-SCNC: 3.8 MMOL/L (ref 3.5–5.3)
PROT SERPL-MCNC: 7.2 G/DL (ref 6.4–8.4)
RBC # BLD AUTO: 4.21 MILLION/UL (ref 3.81–5.12)
SODIUM SERPL-SCNC: 140 MMOL/L (ref 135–147)
TRIGL SERPL-MCNC: 86 MG/DL
WBC # BLD AUTO: 8.78 THOUSAND/UL (ref 4.31–10.16)

## 2022-08-12 PROCEDURE — 80053 COMPREHEN METABOLIC PANEL: CPT

## 2022-08-12 PROCEDURE — 85025 COMPLETE CBC W/AUTO DIFF WBC: CPT

## 2022-08-12 PROCEDURE — 36415 COLL VENOUS BLD VENIPUNCTURE: CPT

## 2022-08-12 PROCEDURE — 80061 LIPID PANEL: CPT

## 2023-05-10 ENCOUNTER — APPOINTMENT (OUTPATIENT)
Dept: RADIOLOGY | Facility: MEDICAL CENTER | Age: 78
End: 2023-05-10

## 2023-05-10 DIAGNOSIS — J45.991 COUGH VARIANT ASTHMA: ICD-10-CM

## 2023-08-02 ENCOUNTER — APPOINTMENT (OUTPATIENT)
Dept: LAB | Age: 78
End: 2023-08-02
Payer: MEDICARE

## 2023-08-02 DIAGNOSIS — I10 HYPERTENSION, UNSPECIFIED TYPE: ICD-10-CM

## 2023-08-02 LAB
ALBUMIN SERPL BCP-MCNC: 3.6 G/DL (ref 3.5–5)
ALP SERPL-CCNC: 85 U/L (ref 46–116)
ALT SERPL W P-5'-P-CCNC: 21 U/L (ref 12–78)
ANION GAP SERPL CALCULATED.3IONS-SCNC: 3 MMOL/L
AST SERPL W P-5'-P-CCNC: 15 U/L (ref 5–45)
BASOPHILS # BLD AUTO: 0.05 THOUSANDS/ÂΜL (ref 0–0.1)
BASOPHILS NFR BLD AUTO: 1 % (ref 0–1)
BILIRUB SERPL-MCNC: 0.36 MG/DL (ref 0.2–1)
BUN SERPL-MCNC: 19 MG/DL (ref 5–25)
CALCIUM SERPL-MCNC: 9.2 MG/DL (ref 8.3–10.1)
CHLORIDE SERPL-SCNC: 110 MMOL/L (ref 96–108)
CHOLEST SERPL-MCNC: 229 MG/DL
CO2 SERPL-SCNC: 27 MMOL/L (ref 21–32)
CREAT SERPL-MCNC: 0.61 MG/DL (ref 0.6–1.3)
EOSINOPHIL # BLD AUTO: 0.25 THOUSAND/ÂΜL (ref 0–0.61)
EOSINOPHIL NFR BLD AUTO: 4 % (ref 0–6)
ERYTHROCYTE [DISTWIDTH] IN BLOOD BY AUTOMATED COUNT: 13 % (ref 11.6–15.1)
GFR SERPL CREATININE-BSD FRML MDRD: 87 ML/MIN/1.73SQ M
GLUCOSE P FAST SERPL-MCNC: 107 MG/DL (ref 65–99)
HCT VFR BLD AUTO: 40.9 % (ref 34.8–46.1)
HDLC SERPL-MCNC: 77 MG/DL
HGB BLD-MCNC: 13.3 G/DL (ref 11.5–15.4)
IMM GRANULOCYTES # BLD AUTO: 0.02 THOUSAND/UL (ref 0–0.2)
IMM GRANULOCYTES NFR BLD AUTO: 0 % (ref 0–2)
LDLC SERPL CALC-MCNC: 135 MG/DL (ref 0–100)
LDLC SERPL DIRECT ASSAY-MCNC: 133 MG/DL (ref 0–100)
LYMPHOCYTES # BLD AUTO: 1.73 THOUSANDS/ÂΜL (ref 0.6–4.47)
LYMPHOCYTES NFR BLD AUTO: 29 % (ref 14–44)
MCH RBC QN AUTO: 31.3 PG (ref 26.8–34.3)
MCHC RBC AUTO-ENTMCNC: 32.5 G/DL (ref 31.4–37.4)
MCV RBC AUTO: 96 FL (ref 82–98)
MONOCYTES # BLD AUTO: 0.61 THOUSAND/ÂΜL (ref 0.17–1.22)
MONOCYTES NFR BLD AUTO: 10 % (ref 4–12)
NEUTROPHILS # BLD AUTO: 3.34 THOUSANDS/ÂΜL (ref 1.85–7.62)
NEUTS SEG NFR BLD AUTO: 56 % (ref 43–75)
NONHDLC SERPL-MCNC: 152 MG/DL
NRBC BLD AUTO-RTO: 0 /100 WBCS
PLATELET # BLD AUTO: 312 THOUSANDS/UL (ref 149–390)
PMV BLD AUTO: 9 FL (ref 8.9–12.7)
POTASSIUM SERPL-SCNC: 4.4 MMOL/L (ref 3.5–5.3)
PROT SERPL-MCNC: 6.9 G/DL (ref 6.4–8.4)
RBC # BLD AUTO: 4.25 MILLION/UL (ref 3.81–5.12)
SODIUM SERPL-SCNC: 140 MMOL/L (ref 135–147)
TRIGL SERPL-MCNC: 83 MG/DL
WBC # BLD AUTO: 6 THOUSAND/UL (ref 4.31–10.16)

## 2023-08-02 PROCEDURE — 36415 COLL VENOUS BLD VENIPUNCTURE: CPT

## 2023-08-02 PROCEDURE — 80053 COMPREHEN METABOLIC PANEL: CPT

## 2023-08-02 PROCEDURE — 85025 COMPLETE CBC W/AUTO DIFF WBC: CPT

## 2023-08-02 PROCEDURE — 83721 ASSAY OF BLOOD LIPOPROTEIN: CPT

## 2023-08-02 PROCEDURE — 80061 LIPID PANEL: CPT

## 2024-02-07 ENCOUNTER — APPOINTMENT (OUTPATIENT)
Dept: RADIOLOGY | Facility: MEDICAL CENTER | Age: 79
End: 2024-02-07
Payer: MEDICARE

## 2024-02-07 DIAGNOSIS — R05.3 CHRONIC COUGH: ICD-10-CM

## 2024-02-07 PROCEDURE — 71046 X-RAY EXAM CHEST 2 VIEWS: CPT

## 2024-02-15 ENCOUNTER — HOSPITAL ENCOUNTER (OUTPATIENT)
Dept: RADIOLOGY | Age: 79
Discharge: HOME | End: 2024-02-15
Payer: MEDICARE

## 2024-02-15 DIAGNOSIS — Z12.31 SCREENING MAMMOGRAM FOR HIGH-RISK PATIENT: ICD-10-CM

## 2024-02-15 PROCEDURE — 77063 BREAST TOMOSYNTHESIS BI: CPT

## 2024-02-15 PROCEDURE — 77067 SCR MAMMO BI INCL CAD: CPT

## 2024-06-24 ENCOUNTER — APPOINTMENT (OUTPATIENT)
Dept: LAB | Age: 79
End: 2024-06-24
Payer: MEDICARE

## 2024-06-24 DIAGNOSIS — I10 ESSENTIAL HYPERTENSION, MALIGNANT: ICD-10-CM

## 2024-06-24 LAB
ALBUMIN SERPL BCG-MCNC: 4.1 G/DL (ref 3.5–5)
ALP SERPL-CCNC: 78 U/L (ref 34–104)
ALT SERPL W P-5'-P-CCNC: 17 U/L (ref 7–52)
ANION GAP SERPL CALCULATED.3IONS-SCNC: 10 MMOL/L (ref 4–13)
AST SERPL W P-5'-P-CCNC: 16 U/L (ref 13–39)
BASOPHILS # BLD AUTO: 0.04 THOUSANDS/ÂΜL (ref 0–0.1)
BASOPHILS NFR BLD AUTO: 1 % (ref 0–1)
BILIRUB SERPL-MCNC: 0.45 MG/DL (ref 0.2–1)
BUN SERPL-MCNC: 18 MG/DL (ref 5–25)
CALCIUM SERPL-MCNC: 9.7 MG/DL (ref 8.4–10.2)
CHLORIDE SERPL-SCNC: 104 MMOL/L (ref 96–108)
CO2 SERPL-SCNC: 27 MMOL/L (ref 21–32)
CREAT SERPL-MCNC: 0.7 MG/DL (ref 0.6–1.3)
EOSINOPHIL # BLD AUTO: 0.27 THOUSAND/ÂΜL (ref 0–0.61)
EOSINOPHIL NFR BLD AUTO: 4 % (ref 0–6)
ERYTHROCYTE [DISTWIDTH] IN BLOOD BY AUTOMATED COUNT: 12.8 % (ref 11.6–15.1)
GFR SERPL CREATININE-BSD FRML MDRD: 83 ML/MIN/1.73SQ M
GLUCOSE P FAST SERPL-MCNC: 94 MG/DL (ref 65–99)
HCT VFR BLD AUTO: 40.5 % (ref 34.8–46.1)
HGB BLD-MCNC: 13.1 G/DL (ref 11.5–15.4)
IMM GRANULOCYTES # BLD AUTO: 0.02 THOUSAND/UL (ref 0–0.2)
IMM GRANULOCYTES NFR BLD AUTO: 0 % (ref 0–2)
LYMPHOCYTES # BLD AUTO: 1.72 THOUSANDS/ÂΜL (ref 0.6–4.47)
LYMPHOCYTES NFR BLD AUTO: 27 % (ref 14–44)
MCH RBC QN AUTO: 30.8 PG (ref 26.8–34.3)
MCHC RBC AUTO-ENTMCNC: 32.3 G/DL (ref 31.4–37.4)
MCV RBC AUTO: 95 FL (ref 82–98)
MONOCYTES # BLD AUTO: 0.57 THOUSAND/ÂΜL (ref 0.17–1.22)
MONOCYTES NFR BLD AUTO: 9 % (ref 4–12)
NEUTROPHILS # BLD AUTO: 3.77 THOUSANDS/ÂΜL (ref 1.85–7.62)
NEUTS SEG NFR BLD AUTO: 59 % (ref 43–75)
NRBC BLD AUTO-RTO: 0 /100 WBCS
PLATELET # BLD AUTO: 304 THOUSANDS/UL (ref 149–390)
PMV BLD AUTO: 8.9 FL (ref 8.9–12.7)
POTASSIUM SERPL-SCNC: 4 MMOL/L (ref 3.5–5.3)
PROT SERPL-MCNC: 7.1 G/DL (ref 6.4–8.4)
RBC # BLD AUTO: 4.25 MILLION/UL (ref 3.81–5.12)
SODIUM SERPL-SCNC: 141 MMOL/L (ref 135–147)
WBC # BLD AUTO: 6.39 THOUSAND/UL (ref 4.31–10.16)

## 2024-06-24 PROCEDURE — 36415 COLL VENOUS BLD VENIPUNCTURE: CPT

## 2024-06-24 PROCEDURE — 80053 COMPREHEN METABOLIC PANEL: CPT

## 2024-06-24 PROCEDURE — 85025 COMPLETE CBC W/AUTO DIFF WBC: CPT

## 2025-04-28 ENCOUNTER — APPOINTMENT (OUTPATIENT)
Dept: LAB | Age: 80
End: 2025-04-28
Attending: FAMILY MEDICINE
Payer: MEDICARE

## 2025-04-28 ENCOUNTER — HOSPITAL ENCOUNTER (OUTPATIENT)
Dept: RADIOLOGY | Age: 80
Discharge: HOME | End: 2025-04-28
Attending: FAMILY MEDICINE
Payer: MEDICARE

## 2025-04-28 DIAGNOSIS — R10.10 UPPER ABDOMINAL PAIN: ICD-10-CM

## 2025-04-28 DIAGNOSIS — I10 ESSENTIAL HYPERTENSION, MALIGNANT: ICD-10-CM

## 2025-04-28 LAB
ALBUMIN SERPL BCG-MCNC: 4 G/DL (ref 3.5–5)
ALP SERPL-CCNC: 81 U/L (ref 34–104)
ALT SERPL W P-5'-P-CCNC: 15 U/L (ref 7–52)
ANION GAP SERPL CALCULATED.3IONS-SCNC: 6 MMOL/L (ref 4–13)
AST SERPL W P-5'-P-CCNC: 18 U/L (ref 13–39)
BASOPHILS # BLD AUTO: 0.05 THOUSANDS/ÂΜL (ref 0–0.1)
BASOPHILS NFR BLD AUTO: 1 % (ref 0–1)
BILIRUB SERPL-MCNC: 0.53 MG/DL (ref 0.2–1)
BUN SERPL-MCNC: 20 MG/DL (ref 5–25)
CALCIUM SERPL-MCNC: 9.5 MG/DL (ref 8.4–10.2)
CHLORIDE SERPL-SCNC: 105 MMOL/L (ref 96–108)
CHOLEST SERPL-MCNC: 197 MG/DL (ref ?–200)
CO2 SERPL-SCNC: 29 MMOL/L (ref 21–32)
CREAT SERPL-MCNC: 0.55 MG/DL (ref 0.6–1.3)
EOSINOPHIL # BLD AUTO: 0.17 THOUSAND/ÂΜL (ref 0–0.61)
EOSINOPHIL NFR BLD AUTO: 3 % (ref 0–6)
ERYTHROCYTE [DISTWIDTH] IN BLOOD BY AUTOMATED COUNT: 13.6 % (ref 11.6–15.1)
GFR SERPL CREATININE-BSD FRML MDRD: 90 ML/MIN/1.73SQ M
GLUCOSE P FAST SERPL-MCNC: 96 MG/DL (ref 65–99)
HCT VFR BLD AUTO: 41.1 % (ref 34.8–46.1)
HDLC SERPL-MCNC: 72 MG/DL
HGB BLD-MCNC: 12.9 G/DL (ref 11.5–15.4)
IMM GRANULOCYTES # BLD AUTO: 0.03 THOUSAND/UL (ref 0–0.2)
IMM GRANULOCYTES NFR BLD AUTO: 1 % (ref 0–2)
LDLC SERPL CALC-MCNC: 114 MG/DL (ref 0–100)
LYMPHOCYTES # BLD AUTO: 1.58 THOUSANDS/ÂΜL (ref 0.6–4.47)
LYMPHOCYTES NFR BLD AUTO: 26 % (ref 14–44)
MCH RBC QN AUTO: 30.2 PG (ref 26.8–34.3)
MCHC RBC AUTO-ENTMCNC: 31.4 G/DL (ref 31.4–37.4)
MCV RBC AUTO: 96 FL (ref 82–98)
MONOCYTES # BLD AUTO: 0.53 THOUSAND/ÂΜL (ref 0.17–1.22)
MONOCYTES NFR BLD AUTO: 9 % (ref 4–12)
NEUTROPHILS # BLD AUTO: 3.66 THOUSANDS/ÂΜL (ref 1.85–7.62)
NEUTS SEG NFR BLD AUTO: 60 % (ref 43–75)
NRBC BLD AUTO-RTO: 0 /100 WBCS
PLATELET # BLD AUTO: 277 THOUSANDS/UL (ref 149–390)
PMV BLD AUTO: 8.6 FL (ref 8.9–12.7)
POTASSIUM SERPL-SCNC: 4.5 MMOL/L (ref 3.5–5.3)
PROT SERPL-MCNC: 6.7 G/DL (ref 6.4–8.4)
RBC # BLD AUTO: 4.27 MILLION/UL (ref 3.81–5.12)
SODIUM SERPL-SCNC: 140 MMOL/L (ref 135–147)
TRIGL SERPL-MCNC: 53 MG/DL (ref ?–150)
WBC # BLD AUTO: 6.02 THOUSAND/UL (ref 4.31–10.16)

## 2025-04-28 PROCEDURE — 80061 LIPID PANEL: CPT

## 2025-04-28 PROCEDURE — 76705 ECHO EXAM OF ABDOMEN: CPT

## 2025-04-28 PROCEDURE — 85025 COMPLETE CBC W/AUTO DIFF WBC: CPT

## 2025-04-28 PROCEDURE — 36415 COLL VENOUS BLD VENIPUNCTURE: CPT

## 2025-04-28 PROCEDURE — 80053 COMPREHEN METABOLIC PANEL: CPT

## 2025-06-26 ENCOUNTER — HOSPITAL ENCOUNTER (OUTPATIENT)
Dept: RADIOLOGY | Age: 80
Discharge: HOME | End: 2025-06-26
Payer: MEDICARE

## 2025-06-26 VITALS — HEIGHT: 63 IN | WEIGHT: 152 LBS | BODY MASS INDEX: 26.93 KG/M2

## 2025-06-26 DIAGNOSIS — Z12.31 ENCOUNTER FOR SCREENING MAMMOGRAM FOR MALIGNANT NEOPLASM OF BREAST: ICD-10-CM

## 2025-06-26 DIAGNOSIS — Z12.31 ENCOUNTER FOR SCREENING MAMMOGRAM FOR HIGH-RISK PATIENT: ICD-10-CM

## 2025-06-26 PROCEDURE — 77067 SCR MAMMO BI INCL CAD: CPT

## 2025-06-26 PROCEDURE — 77063 BREAST TOMOSYNTHESIS BI: CPT
